# Patient Record
(demographics unavailable — no encounter records)

---

## 2024-11-09 NOTE — PHYSICAL EXAM
[Conjuctival Injection] : no conjunctival injection [Discharge] : no discharge [Erythema] : no erythema [Bulging] : not bulging [Clear Rhinorrhea] : clear rhinorrhea [Erythematous Oropharynx] : erythematous oropharynx [Enlarged Tonsils] : enlarged tonsils [Vesicles] : no vesicles [Exudate] : no exudate [Ulcerative Lesions] : no ulcerative lesions [Palate petechiae] : palate without petechiae [Enlarged] : enlarged [Submandibular] : submandibular [NL] : warm, clear

## 2024-11-09 NOTE — DISCUSSION/SUMMARY
[FreeTextEntry1] : Symptomatic treatment of fever and/or pain discussed Stat strep test ordered Throat culture, if POSITIVE, give Amoxicillin 500 mg BID x 10 days Covid test  Hydrate well Handwashing and infection control discussed Return to office if febrile > 48 hours or if symptoms get worse Go to ER if unable to come to the office or during after hours, parent encouraged to call service first before doing so. Recheck prn

## 2024-11-09 NOTE — REVIEW OF SYSTEMS
[Ear Pain] : no ear pain [Nasal Discharge] : nasal discharge [Nasal Congestion] : nasal congestion [Sore Throat] : sore throat [Cyanosis] : no cyanosis [Tachypnea] : not tachypneic [Wheezing] : no wheezing [Cough] : cough [Vomiting] : no vomiting [Diarrhea] : no diarrhea [Negative] : Genitourinary

## 2024-11-09 NOTE — HISTORY OF PRESENT ILLNESS
[de-identified] : Sore throat, no fever [FreeTextEntry6] : Sore throat Snoring Cough and runny nose  No fever or temp > 100 No ear pain No wheezing or dyspnea Normal appetite, No vomiting, No diarrhea No body aches or HA No smell or taste issues No sick contacts No Covid contacts or exposure No recent travel or contact with travelers

## 2024-11-23 NOTE — DISCUSSION/SUMMARY
[FreeTextEntry1] : Symptomatic treatment of fever and/or pain discussed Stat strep test ordered Throat culture, if POSITIVE, give Amoxicillin 400/5 1.5 tsp BID x 10 days Hydrate well Handwashing and infection control discussed Return to office if symptoms persist, worsen or febrile

## 2024-11-23 NOTE — HISTORY OF PRESENT ILLNESS
[de-identified] : per mom cough, and sore on inside of mouth on gum, afebrile  [FreeTextEntry6] : No fever No Sore throat but has sore in R upper gums which is painful Hoarse voice yesterday Cough on and off x 1 week runny nose, nasal congestion x last night No chest pain or SOB No vomiting, no diarrhea appetite decreased, + fluids normal UOP

## 2024-11-23 NOTE — PHYSICAL EXAM
[Enlarged Tonsils] : enlarged tonsils [NL] : warm, clear [Wheezing] : no wheezing [Rales] : no rales [Tachypnea] : no tachypnea [Rhonchi] : no rhonchi [de-identified] : small sore R upper gumline

## 2025-01-28 NOTE — HISTORY OF PRESENT ILLNESS
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   Mother reports that Ting has been well since last visit. Mother reports that Ting's breast tissue has increased in size and her pubic hair has increased since last visit. Mother reports that Ting has been outgrowing clothes and shoes (size 2). Ting denies headaches, vision changes, abdominal pains.   Since last visit, Ting has grown 5.2 cm and gained 10 lbs. Height velocity 8.6 cm/yr. Mother's height 61 inches Father's height 72 inches Mid-parental sex-adjusted target height 64 inches

## 2025-01-28 NOTE — DATA REVIEWED
[FreeTextEntry1] : Labs were normal except for an elevated androstenedione, consistent with premature adrenarche. Nothing more to do until follow-up in September. Her bone age is advanced secondary to adrenarche (CA 6 yr 0 mo read as 8 yr 10 mo). Will continue to monitor growth velocity.

## 2025-01-28 NOTE — ASSESSMENT
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 She has a BMI in the obese range likely secondary to excess caloric intake and lack of physical activity. 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   Plan - I reviewed that girls with premature adrenarche are at increased risk of obesity, metabolic syndrome, type 2 diabetes and PCOS.  I have recommended continuing a healthy meal plan and an active lifestyle to minimize the risk of developing these problems. - Repeat gonadotropins and estradiol before next visit.  - Decrease caloric intake.  - Increase physical activity.  - Follow up in 6 months.   Cristine Buenrostro MD Pediatric Endocrinology [address] [facility], [address] [place] [address] [phone]

## 2025-01-28 NOTE — PHYSICAL EXAM
[Healthy Appearing] : healthy appearing [Well Nourished] : well nourished [Interactive] : interactive [Normal Appearance] : normal appearance [Well formed] : well formed [Normally Set] : normally set [Normal S1 and S2] : normal S1 and S2 [Clear to Ausculation Bilaterally] : clear to auscultation bilaterally [Abdomen Soft] : soft [Abdomen Tenderness] : non-tender [] : no hepatosplenomegaly [2] : was Rufino stage 2 [Rufino Stage ___] : the Rufino stage for breast development was [unfilled] [Normal] : normal  [Murmur] : no murmurs [FreeTextEntry2] : Axillary hair None

## 2025-01-28 NOTE — CONSULT LETTER
[Dear  ___] : Dear  [unfilled], [Courtesy Letter:] : I had the pleasure of seeing your patient, [unfilled], in my office today. [Please see my note below.] : Please see my note below. [Consult Closing:] : Thank you very much for allowing me to participate in the care of this patient.  If you have any questions, please do not hesitate to contact me. [Sincerely,] : Sincerely, [FreeTextEntry3] : Cristine Buenrostro MD Pediatric Endocrinologist

## 2025-01-28 NOTE — ASSESSMENT
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 She has a BMI in the obese range likely secondary to excess caloric intake and lack of physical activity. 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   Plan - I reviewed that girls with premature adrenarche are at increased risk of obesity, metabolic syndrome, type 2 diabetes and PCOS.  I have recommended continuing a healthy meal plan and an active lifestyle to minimize the risk of developing these problems. - Repeat gonadotropins and estradiol before next visit.  - Decrease caloric intake.  - Increase physical activity.  - Follow up in 6 months.   Cristine Buenrostro MD Pediatric Endocrinology [address] [facility], [address] [place] [address] [phone]

## 2025-02-20 NOTE — HISTORY OF PRESENT ILLNESS
[de-identified] : rash all over, itchy, wants checked for strep  [FreeTextEntry6] : started with bumpy rash on face, trunk and arms kind of itchy benadryl helps no new contacts no sore throat

## 2025-02-20 NOTE — PHYSICAL EXAM
[TextEntry] : General: awake, alert, cooperative, appropriate, no acute distress Head: no signs injury Eyes: EOMI, PERRL, no discharge, no conjunctival or scleral erythema  Ears: tympanic membranes clear bilaterally without erythema or purulent effusion, normal light reflex Nose: no rhinnorhea Mouth: mucosa moist and pink, no erythema to the oropharynx, no vesicles, exudates, lesions or soft palate petechiae Neck: supple, good range of motion Lungs: clear to auscultation bilaterally  Cardiac: normal S1 S2, regular rate and rhythm Abdomen: soft, non tender, non distended Lymphatics: no cervical lymphadenopathy, no pre or post auricular lymphadenopathy, no occipital lymphadenopathy Skin: generalized flesh colored papular fine rash over trunk and on face and arms, no purpura, no vesicles

## 2025-02-20 NOTE — HISTORY OF PRESENT ILLNESS
[de-identified] : rash all over, itchy, wants checked for strep  [FreeTextEntry6] : started with bumpy rash on face, trunk and arms kind of itchy benadryl helps no new contacts no sore throat

## 2025-02-20 NOTE — PLAN
[TextEntry] : Symptomatic treatment Maintain adequate hydration  Stressed handwashing and infection control  Pay close observation for new or worsening symptoms Instructed to return to office if condition worsens or new symptoms arise Go to ER or UC if condition worsens or unable to get to the office or after office hours Close observation advised - dyspnea, dysphagia, swollen mucous membranes, worsening symptoms to go to er/call 911 Antihistamines as discussed  Next Visit: as needed with an office visit if symptoms worsen or persist

## 2025-07-05 NOTE — PHYSICAL EXAM
[TextEntry] : General: awake, alert, no acute distress, interactive, cooperative, appropriate for age Head: normocephalic, no signs injury Eyes: + red reflex bilaterally, EOMI, no purulent discharge, no conjunctival or scleral erythema Ears: tympanic membranes normal appearing bilaterally, no ear pit or tag Nose: no discharge Mouth: mucosa moist and pink, oropharynx without erythema Neck: supple, FROM Lungs: clear to auscultation bilaterally, no accessory muscle use Cardiac: normal S1 S2, regular rate and rhythm, no murmur appreciated Abdomen: soft, non tender, non distended, no hepatosplenomegaly  Chest: sascha 1 breast development Genitals: sascha 1 normal appearing female genitalia Lymphatics: no abnormal lymph nodes palpated Back: no scoliosis noted Skin: no rash, no lesions

## 2025-07-05 NOTE — HISTORY OF PRESENT ILLNESS
[Mother] : mother [Yes] : Patient goes to dentist yearly [Toothpaste] : Primary Fluoride Source: Toothpaste [No] : No cigarette smoke exposure [Appropriately restrained in motor vehicle] : appropriately restrained in motor vehicle [Supervised outdoor play] : supervised outdoor play [Supervised around water] : supervised around water [Wears helmet and pads] : wears helmet and pads [NO] : No [FreeTextEntry1] : lives with parents in grade finished 1st, graduated from speech  doing well in school, likes teacher, has friends, no bullying no problems in school identified, no ADHD concerns participates in soccer  no history of injury  and  patient is doing well - has no concerns or issues. appetite good, eats variety of foods, 3 meals a day and snacks, consumes fruits, vegetables, meat, dairy no sleep concerns, goes to dentist regularly, brushing teeth 1-2 x a day (tries 2 x a day) patient not having any fevers without a cause, pain that wakes them in the night, or night sweats. Urinating and stooling normally, no chest pain, palpitations or syncope with exercise. Parent(s) have no current concerns or issues will f/u endo, things have stayed the same as far as development

## 2025-07-05 NOTE — PLAN
[TextEntry] : Continue balanced diet with all food groups. Brush teeth twice a day with toothbrush. Recommend visit to dentist. Help child to maintain consistent daily routines and sleep schedule. School discussed. Ensure home is safe. Teach child about personal safety. Use consistent, positive discipline. Limit screen time to no more than 2 hours per day. Encourage physical activity. Child needs to ride in a belt-positioning booster seat until  4 feet 9 inches has been reached and are between 8 and 12 years of age.  Can participate in all age related sports without restriction.  Return 1 year for routine well child check, sooner if concerns. 5-2-1-0 form reviewed, care coordination reviewed